# Patient Record
Sex: FEMALE | Race: WHITE | Employment: FULL TIME | URBAN - METROPOLITAN AREA
[De-identification: names, ages, dates, MRNs, and addresses within clinical notes are randomized per-mention and may not be internally consistent; named-entity substitution may affect disease eponyms.]

---

## 2017-05-08 LAB
HEPATITIS B SURFACE ANTIGEN INTERPRETATION: NORMAL
HEPATITIS C ANTIBODY INTERPRETATION: NORMAL

## 2017-05-09 LAB — RPR: NORMAL

## 2017-05-10 LAB — HIV-1 AND HIV-2 ANTIBODIES: NEGATIVE

## 2017-10-16 ENCOUNTER — OFFICE VISIT (OUTPATIENT)
Dept: INTERNAL MEDICINE CLINIC | Age: 32
End: 2017-10-16

## 2017-10-16 VITALS
BODY MASS INDEX: 23.25 KG/M2 | TEMPERATURE: 97.7 F | HEART RATE: 72 BPM | HEIGHT: 68 IN | SYSTOLIC BLOOD PRESSURE: 100 MMHG | DIASTOLIC BLOOD PRESSURE: 66 MMHG | WEIGHT: 153.4 LBS

## 2017-10-16 DIAGNOSIS — R53.82 CHRONIC FATIGUE: Primary | ICD-10-CM

## 2017-10-16 DIAGNOSIS — R49.0 HOARSE VOICE QUALITY: ICD-10-CM

## 2017-10-16 LAB
A/G RATIO: 1.5 (ref 1.1–2.2)
ALBUMIN SERPL-MCNC: 4.6 G/DL (ref 3.4–5)
ALP BLD-CCNC: 49 U/L (ref 40–129)
ALT SERPL-CCNC: 12 U/L (ref 10–40)
ANION GAP SERPL CALCULATED.3IONS-SCNC: 13 MMOL/L (ref 3–16)
AST SERPL-CCNC: 19 U/L (ref 15–37)
BASOPHILS ABSOLUTE: 0 K/UL (ref 0–0.2)
BASOPHILS RELATIVE PERCENT: 0.7 %
BILIRUB SERPL-MCNC: 0.6 MG/DL (ref 0–1)
BUN BLDV-MCNC: 16 MG/DL (ref 7–20)
CALCIUM SERPL-MCNC: 9.6 MG/DL (ref 8.3–10.6)
CHLORIDE BLD-SCNC: 99 MMOL/L (ref 99–110)
CO2: 27 MMOL/L (ref 21–32)
CREAT SERPL-MCNC: 0.6 MG/DL (ref 0.6–1.1)
EOSINOPHILS ABSOLUTE: 0.1 K/UL (ref 0–0.6)
EOSINOPHILS RELATIVE PERCENT: 1.2 %
GFR AFRICAN AMERICAN: >60
GFR NON-AFRICAN AMERICAN: >60
GLOBULIN: 3 G/DL
GLUCOSE BLD-MCNC: 91 MG/DL (ref 70–99)
HCT VFR BLD CALC: 40.8 % (ref 36–48)
HEMOGLOBIN: 13.9 G/DL (ref 12–16)
LYMPHOCYTES ABSOLUTE: 1.8 K/UL (ref 1–5.1)
LYMPHOCYTES RELATIVE PERCENT: 34.3 %
MCH RBC QN AUTO: 32.5 PG (ref 26–34)
MCHC RBC AUTO-ENTMCNC: 34 G/DL (ref 31–36)
MCV RBC AUTO: 95.7 FL (ref 80–100)
MONOCYTES ABSOLUTE: 0.3 K/UL (ref 0–1.3)
MONOCYTES RELATIVE PERCENT: 5.7 %
NEUTROPHILS ABSOLUTE: 3.1 K/UL (ref 1.7–7.7)
NEUTROPHILS RELATIVE PERCENT: 58.1 %
PDW BLD-RTO: 13.8 % (ref 12.4–15.4)
PLATELET # BLD: 204 K/UL (ref 135–450)
PMV BLD AUTO: 8.8 FL (ref 5–10.5)
POTASSIUM SERPL-SCNC: 4.9 MMOL/L (ref 3.5–5.1)
RBC # BLD: 4.27 M/UL (ref 4–5.2)
SODIUM BLD-SCNC: 139 MMOL/L (ref 136–145)
TOTAL PROTEIN: 7.6 G/DL (ref 6.4–8.2)
TSH REFLEX FT4: 2.19 UIU/ML (ref 0.27–4.2)
VITAMIN B-12: 998 PG/ML (ref 211–911)
WBC # BLD: 5.3 K/UL (ref 4–11)

## 2017-10-16 PROCEDURE — 99214 OFFICE O/P EST MOD 30 MIN: CPT | Performed by: INTERNAL MEDICINE

## 2017-10-18 LAB — IGA: 240 MG/DL (ref 68–408)

## 2017-10-19 LAB — TISSUE TRANSGLUTAMINASE IGA: 0 U/ML (ref 0–3)

## 2018-05-01 ENCOUNTER — TELEPHONE (OUTPATIENT)
Dept: INTERNAL MEDICINE CLINIC | Age: 33
End: 2018-05-01

## 2018-06-28 ENCOUNTER — OFFICE VISIT (OUTPATIENT)
Dept: INTERNAL MEDICINE CLINIC | Age: 33
End: 2018-06-28

## 2018-06-28 VITALS
HEIGHT: 68 IN | SYSTOLIC BLOOD PRESSURE: 108 MMHG | WEIGHT: 156.6 LBS | DIASTOLIC BLOOD PRESSURE: 72 MMHG | HEART RATE: 60 BPM | BODY MASS INDEX: 23.73 KG/M2

## 2018-06-28 DIAGNOSIS — H91.93 BILATERAL HEARING LOSS, UNSPECIFIED HEARING LOSS TYPE: Primary | ICD-10-CM

## 2018-06-28 PROCEDURE — 99213 OFFICE O/P EST LOW 20 MIN: CPT | Performed by: INTERNAL MEDICINE

## 2018-06-28 RX ORDER — FLUTICASONE PROPIONATE 50 MCG
1 SPRAY, SUSPENSION (ML) NASAL DAILY
COMMUNITY
End: 2021-02-04

## 2018-07-09 ENCOUNTER — HOSPITAL ENCOUNTER (OUTPATIENT)
Dept: CT IMAGING | Age: 33
Discharge: HOME | End: 2018-07-09
Attending: INTERNAL MEDICINE | Admitting: INTERNAL MEDICINE

## 2018-07-09 DIAGNOSIS — Z85.71 HISTORY OF HODGKIN'S LYMPHOMA: ICD-10-CM

## 2020-07-15 ENCOUNTER — TELEPHONE (OUTPATIENT)
Dept: ADMINISTRATIVE | Age: 35
End: 2020-07-15

## 2020-07-20 ENCOUNTER — OFFICE VISIT (OUTPATIENT)
Dept: PRIMARY CARE CLINIC | Age: 35
End: 2020-07-20
Payer: COMMERCIAL

## 2020-07-20 PROCEDURE — 99211 OFF/OP EST MAY X REQ PHY/QHP: CPT | Performed by: NURSE PRACTITIONER

## 2020-07-20 NOTE — PROGRESS NOTES
[facility] received a viral test for COVID-19. They were educated on isolation and quarantine as appropriate. For any symptoms, they were directed to seek care from their PCP, given contact information to establish with a doctor, directed to an urgent care or the emergency room.

## 2020-07-25 LAB
SARS-COV-2: NOT DETECTED
SOURCE: NORMAL

## 2020-08-05 ENCOUNTER — TELEPHONE (OUTPATIENT)
Dept: INTERNAL MEDICINE CLINIC | Age: 35
End: 2020-08-05

## 2020-08-05 NOTE — TELEPHONE ENCOUNTER
----- Message from Reji Waterman sent at 8/5/2020 11:14 AM EDT -----  Subject: Message to Provider    QUESTIONS  Information for Provider? Patient is requesting a motion sickness patch be   sent to pharmacy.   ---------------------------------------------------------------------------  --------------  [address]  What is the best way for the office to contact you? OK to leave message on   voicemail  Preferred Call Back Phone Number? [phone]  ---------------------------------------------------------------------------  --------------  SCRIPT ANSWERS  Relationship to Patient?  Self

## 2020-08-05 NOTE — TELEPHONE ENCOUNTER
Pharmacy was not given when call taken from [facility]  [facility] for patient to ask pharmacy

## 2020-08-05 NOTE — TELEPHONE ENCOUNTER
----- Message from Manueljoshbarry Johnson sent at 8/5/2020 12:37 PM EDT -----  Subject: Message to Provider    QUESTIONS  Information for Provider? Pt needs motion sick medication sent to [address] [place] [address]  ---------------------------------------------------------------------------  --------------  Annamaria MURILLO  What is the best way for the office to contact you? OK to leave message on   voicemail  Preferred Call Back Phone Number? [phone]  ---------------------------------------------------------------------------  --------------  SCRIPT ANSWERS  Relationship to Patient?  Self

## 2020-08-06 RX ORDER — SCOLOPAMINE TRANSDERMAL SYSTEM 1 MG/1
1 PATCH, EXTENDED RELEASE TRANSDERMAL
Qty: 2 PATCH | Refills: 11 | Status: SHIPPED | OUTPATIENT
Start: 2020-08-06 | End: 2021-02-04

## 2020-12-10 ENCOUNTER — OFFICE VISIT (OUTPATIENT)
Dept: PRIMARY CARE CLINIC | Age: 35
End: 2020-12-10
Payer: COMMERCIAL

## 2020-12-10 PROCEDURE — 99211 OFF/OP EST MAY X REQ PHY/QHP: CPT | Performed by: NURSE PRACTITIONER

## 2020-12-10 NOTE — PROGRESS NOTES
[facility] received a viral test for COVID-19. They were educated on isolation and quarantine as appropriate. For any symptoms, they were directed to seek care from their PCP, given contact information to establish with a doctor, directed to an urgent care or the emergency room.

## 2020-12-12 LAB — SARS-COV-2, NAA: NOT DETECTED

## 2021-01-28 ENCOUNTER — TELEPHONE (OUTPATIENT)
Dept: INTERNAL MEDICINE CLINIC | Age: 36
End: 2021-01-28

## 2021-01-28 NOTE — TELEPHONE ENCOUNTER
----- Message from Amparo Joyner sent at 1/28/2021  7:57 AM EST -----  Subject: Message to Provider    QUESTIONS  Information for Provider? Patient would like the Dr to call her something   in   states Preparation H is not working. [address] [employer].   ---------------------------------------------------------------------------  --------------  [address]  What is the best way for the office to contact you? OK to leave message on   voicemail  Preferred Call Back Phone Number? [phone]  ---------------------------------------------------------------------------  --------------  SCRIPT ANSWERS  Relationship to Patient?  Self

## 2021-01-28 NOTE — TELEPHONE ENCOUNTER
Called pt to advise nothing will be sent in until she is seen. She is not able to get off work today. Was going to schedule her for tomorrow but she states she is moving. Advised her she will need to be seen. Has not been seen in over 2 years.     Patient would like to know if there is something stronger OTC that she can take aside from Preparation H.

## 2021-02-04 ENCOUNTER — OFFICE VISIT (OUTPATIENT)
Dept: INTERNAL MEDICINE CLINIC | Age: 36
End: 2021-02-04
Payer: COMMERCIAL

## 2021-02-04 VITALS
SYSTOLIC BLOOD PRESSURE: 112 MMHG | DIASTOLIC BLOOD PRESSURE: 72 MMHG | HEART RATE: 84 BPM | WEIGHT: 153.4 LBS | TEMPERATURE: 97.3 F | HEIGHT: 67 IN | OXYGEN SATURATION: 99 % | BODY MASS INDEX: 24.08 KG/M2

## 2021-02-04 DIAGNOSIS — K64.4 INFLAMED EXTERNAL HEMORRHOID: ICD-10-CM

## 2021-02-04 DIAGNOSIS — Z23 NEED FOR INFLUENZA VACCINATION: ICD-10-CM

## 2021-02-04 DIAGNOSIS — K59.00 CONSTIPATION, UNSPECIFIED CONSTIPATION TYPE: ICD-10-CM

## 2021-02-04 DIAGNOSIS — Z00.00 ANNUAL PHYSICAL EXAM: Primary | ICD-10-CM

## 2021-02-04 PROCEDURE — 99395 PREV VISIT EST AGE 18-39: CPT | Performed by: NURSE PRACTITIONER

## 2021-02-04 PROCEDURE — 90471 IMMUNIZATION ADMIN: CPT | Performed by: NURSE PRACTITIONER

## 2021-02-04 PROCEDURE — 90686 IIV4 VACC NO PRSV 0.5 ML IM: CPT | Performed by: NURSE PRACTITIONER

## 2021-02-04 RX ORDER — HYDROCORTISONE 25 MG/G
CREAM TOPICAL 2 TIMES DAILY PRN
Qty: 1 TUBE | Refills: 1 | Status: SHIPPED | OUTPATIENT
Start: 2021-02-04 | End: 2021-08-19

## 2021-02-04 RX ORDER — LIDOCAINE 5 G/100G
1 CREAM RECTAL; TOPICAL 3 TIMES DAILY PRN
Qty: 1 TUBE | Refills: 1 | Status: SHIPPED | OUTPATIENT
Start: 2021-02-04 | End: 2021-08-19

## 2021-02-04 ASSESSMENT — ENCOUNTER SYMPTOMS
CHEST TIGHTNESS: 0
WHEEZING: 0
SHORTNESS OF BREATH: 0
COUGH: 0

## 2021-02-04 ASSESSMENT — PATIENT HEALTH QUESTIONNAIRE - PHQ9
SUM OF ALL RESPONSES TO PHQ QUESTIONS 1-9: 0
1. LITTLE INTEREST OR PLEASURE IN DOING THINGS: 0
SUM OF ALL RESPONSES TO PHQ QUESTIONS 1-9: 0

## 2021-02-04 NOTE — PROGRESS NOTES
2/4/21     Chief Complaint   Patient presents with    Annual Exam    Hemorrhoids     has had a hemorrhoid for 2 weeks now     HPI    Pt is here for annual exam  Overall doing well and feeling well     History of lymphoma - seeing [facility] - Dr. Marni Drummond   Recently had lab work complete there - reports stable     Hemorrhoid has been inflamed for the past 2 weeks   Gets them often. Usually able to control without intervention. Has been using Prep H   Reports they are bleeding intermittently with minimal amount of blood. Gets constipated often and has had hard stools   Does not drink a lot of water. Not taking anything for constipation. Increased stress makes it worse - recently moved into new home    Reports it has improved significantly but is still pretty large     No Known Allergies    Current Outpatient Medications   Medication Sig Dispense Refill    hydrocortisone (ANUSOL-HC) 2.5 % CREA rectal cream Place rectally 2 times daily as needed for Hemorrhoids 1 Tube 1    Lidocaine, Anorectal, 5 % CREA Apply 1 each topically 3 times daily as needed (Hemorrhoids) 1 Tube 1     No current facility-administered medications for this visit. Review of Systems   Constitutional: Negative for chills, fatigue and fever. Respiratory: Negative for cough, chest tightness, shortness of breath and wheezing. Cardiovascular: Negative for chest pain, palpitations and leg swelling. Neurological: Negative for dizziness, tremors, light-headedness and headaches. Vitals:    02/04/21 0849   BP: 112/72   Pulse: 84   Temp: 97.3 °F (36.3 °C)   SpO2: 99%   Weight: 153 lb 6.4 oz (69.6 kg)   Height: 5' 7\" (1.702 m)      Physical Exam  Vitals signs reviewed. Constitutional:       General: She is not in acute distress. Appearance: She is well-developed. She is not ill-appearing or diaphoretic. HENT:      Head: Normocephalic and atraumatic. Cardiovascular:      Rate and Rhythm: Normal rate and regular rhythm. Heart sounds: Normal heart sounds. No murmur. Pulmonary:      Effort: Pulmonary effort is normal. No respiratory distress. Breath sounds: Normal breath sounds. No wheezing or rhonchi. Genitourinary:     Rectum: Tenderness and external hemorrhoid present. Comments: Moderate sized tender inflammed external hemorrhoid, non thrombosed  Skin:     General: Skin is warm and dry. Neurological:      General: No focal deficit present. Mental Status: She is alert and oriented to person, place, and time. Psychiatric:         Mood and Affect: Mood and affect normal.         Behavior: Behavior normal.       Assessment/Plan:  Annual physical exam  Overall doing well   Labs with [facility] - will request     Inflamed external hemorrhoid / Constipation  Stable, uncontrolled yet improving   Referral for colorectal   Reviewed supportive care  Start fiber supplement daily for better control of constipation and avoid straining. Increase water intake. - hydrocortisone (ANUSOL-HC) 2.5 % CREA rectal cream; Place rectally 2 times daily as needed for Hemorrhoids  Dispense: 1 Tube; Refill: 1  - Lidocaine, Anorectal, 5 % CREA; Apply 1 each topically 3 times daily as needed (Hemorrhoids)  Dispense: 1 Tube; Refill: 1  - Makenna Cook MD, Colorectal Surgery, [facility]    Need for influenza vaccination  - INFLUENZA, QUADV, 3 YRS AND OLDER, IM PF, PREFILL SYR OR SDV, 0.5ML (Dalton Carrier, PF)    Discussed medications with patient, who voiced understanding of their use and indications. All questions answered.     Follow up yearly and prn     Electronically signed by ADAM Kathleen CNP on 2/4/2021 at 9:28 AM

## 2021-02-24 ENCOUNTER — OFFICE VISIT (OUTPATIENT)
Dept: SURGERY | Age: 36
End: 2021-02-24
Payer: COMMERCIAL

## 2021-02-24 VITALS
HEART RATE: 77 BPM | BODY MASS INDEX: 24.33 KG/M2 | WEIGHT: 155 LBS | RESPIRATION RATE: 16 BRPM | DIASTOLIC BLOOD PRESSURE: 74 MMHG | HEIGHT: 67 IN | SYSTOLIC BLOOD PRESSURE: 108 MMHG | TEMPERATURE: 97.5 F

## 2021-02-24 DIAGNOSIS — Z85.71 HISTORY OF HODGKIN'S DISEASE: ICD-10-CM

## 2021-02-24 DIAGNOSIS — K64.5 THROMBOSED EXTERNAL HEMORRHOID: Primary | ICD-10-CM

## 2021-02-24 PROCEDURE — 99203 OFFICE O/P NEW LOW 30 MIN: CPT | Performed by: SURGERY

## 2021-02-24 PROCEDURE — 46600 DIAGNOSTIC ANOSCOPY SPX: CPT | Performed by: SURGERY

## 2021-02-24 RX ORDER — M-VIT,TX,IRON,MINS/CALC/FOLIC 27MG-0.4MG
1 TABLET ORAL DAILY
COMMUNITY

## 2021-02-24 NOTE — PROGRESS NOTES
[address] [address] [address]  Dept: [phone]  Dept Fax: [phone]  Loc: [phone]    Visit Date: 2/24/2021    Tucker Shepherd is a 28 y.o. female who presents today for: Hemorrhoids      HPI:       Tucker Shepherd is a 28 y.o. female referred by Dr. Kirk Salcedo and María Butterfield for hemorrhoids. Patient has had 3 wks of hemorrhoids symptoms. Main complaints include: bulge, pain. Recently improving. Bleeding: yes  Constipation: yes  Mucus drainage: no  Tissue prolapse: no  Patient has tried: none  Previous anorectal surgeries: no  Incontinence: no    Patient denies fever, chills, abd pain, nausea, emesis, weight loss. Patient's problem list, medications, past medical, surgical, family, and social histories were reviewed and updated in the chart as indicated today. 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/2008    Hodgkin lymphoma ([facility]     Hx of biopsy 12/2007       Past Surgical History:   Procedure Laterality Date    BONE BIOPSY      BONE MARROW BIOPSY      LEEP      LEEP  [id number]    TUNNELED VENOUS PORT PLACEMENT      insert/removal    TUNNELED VENOUS PORT PLACEMENT  12/2007       Family History: Family history of colorectal cancer/polyps: no    Social History:   Social History     Tobacco Use    Smoking status: Never Smoker    Smokeless tobacco: Never Used   Substance Use Topics    Alcohol use: No     Alcohol/week: 7.0 standard drinks     Types: 7 Standard drinks or equivalent per week      Tobacco cessation counseling provided as appropriate. REVIEW OF SYSTEMS:    Pertinent positives and negatives are mentioned in the HPI above. Otherwise, all other systems were reviewed and negative.       Objective:     Physical Exam   /74 (Site: Left Upper Arm, Position: Sitting, Cuff Size: Medium Adult)   Pulse 77 Temp 97.5 °F (36.4 °C) (Temporal)   Resp 16   Ht 5' 7\" (1.702 m)   Wt 155 lb (70.3 kg)   BMI 24.28 kg/m²   Constitutional: Appears well-developed and well-nourished. Grooming appropriate. No gross deformities. Body mass index is 24.28 kg/m². Eyes: No scleral icterus. Conjunctiva/lids normal. Vision intact grossly. Pupils equal/symmetric, reactive bilaterally. ENT: External ears/nose without defect, scars, or masses. Hearing grossly intact. No facial deformity. Lips normal, normal dentition. Neck: No masses. Trachea midline. No crepitus. Thyroid not enlarged. Cardiovascular: Normal rate. No peripheral edema. Abdominal aorta normal size to palpation. Pulmonary/Chest: Effort normal. No respiratory distress. No wheezes. No use of accessory muscles. Musculoskeletal: Normal range of motion x all 4 extremities and head/neck, without deformity, pain, or crepitus, with normal strength and tone. Normal gait. Nails without clubbing or cyanosis. Neurological: Alert and oriented to person, place, and time. No gross deficits. Sensation intact. Skin: Skin is dry. No rashes noted. No pallor. No induration of nodules. Psychiatric: Normal mood and affect. Behavior normal. Oriented to person, place, and time. Judgment and insight reasonable. Abdominal/wound: soft, nontender    During my initial anorectal exam I was unable to obtain clear visualization of the anal canal, so I determined an anoscopy would be required. I discussed with the patient and they agreed to proceed with anoscopy. ANOSCOPY:    Chaperone/MA present in room during entire exam. Patient was placed in left lateral down or knee-chest positioning depending on patient comfort. Exam table manipulated for proper visualization and lighting. Buttocks spread. Inspection reveals: no perianal skin lesions, excoriation. External hemorrhoids/tags: yes - thrombosed small ext hemorrhoid without tenerness .      Digital exam performed with lubricated index finger revealing: no masses, induration, or tenderness. No gross blood. Normal tone. Lubricated anoscope inserted gently into anus and withdrawn with light attachment for visualization of distal rectum and anal canal: Mucosa appeared normal, without masses or evidence of proctitis. Mild internal hemorrhoidal tissue visible. Bleeding: no.    Anoscope removed without difficulty. Patient tolerated procedure well without complications. See below for further recommendations. Labs reviewed: none  Radiology reviewed: none    Last colonoscopy: none      Assessment/Plan:     A/P:  New problem(s): Hemorrhoids: thrombosed external  Established problem(s): history lymphoma  Additional workup/treatment planned and options discussed:   1) Lifestyle and stool regimen: Fiber supplementation, sitz baths, improving dietary and lifestyle choices  2) Procedural: Rubber band ligation  3) Hemorrhoidectomy - if fails less invasive meaures  Risk of complications/morbidity: moderate    Patient has signs, symptoms, and exam consistent with thrombosed external hemorrhoid, resolving. We discussed the pathophysiology, etiology, natural history, workup, and treatment options, including procedural and surgical risks for hemorrhoids. Discussed that if she does have a recurrence of external thrombosed hemorrhoid, if she calls me right away, we can plan for an office excision which can decrease the duration of time of the discomfort. Otherwise, the studies of hemorrhoids will resolve spontaneously but may take a few weeks. Colonoscopy if she continues to have rectal bleeding that does not improve after the hemorrhoid improves.     I provided written information in the After Visit Summary AVS Regarding: Hemorrhoids    DISPOSITION:  F/u for symptom reassessment, possible rubber band ligation    My findings will be relayed to consulting practitioner or PCP via Epic    Total encounter time:  30 min, including any number of the

## 2021-02-24 NOTE — PATIENT INSTRUCTIONS
Hemorrhoids: Information and Care Instructions        Hemorrhoids are enlarged veins that develop in the anal canal. They can occur with constipation, diarrhea, straining and pushing during bowel movements, pregnancy, and smoking/coughing. The tissue can get irritated and inflamed, causing bleeding, itching, swelling, and pain. Hemorrhoids can be internal or external (or occasionally both). Sometimes internal hemorrhoids can prolapse, or come out of the anus, causing difficulty keeping clean, mucus drainage, bleeding, and discomfort. External hemorrhoids are more likely to clot and cause sudden severe pain on the outside of the anus. They can be uncomfortable at times, but hemorrhoids rarely are a life-threatening problem. However, not all bleeding and pain can be blamed on hemorrhoids until a thorough examination (and sometimes a colonoscopy) is performed. The initial treatment for both internal and external hemorrhoids is the same, as below:    STOOL REGIMEN for hemorrhoids:    Stools should be soft, formed, and easy to pass consistency, not diarrhea, without the need to strain. 1) Eat a healthy diet with lots of fruits and vegetables. Exercise and be active. 2) Use a fiber supplement twice daily (Metamucil, Benefiber, Konsyl, Citrucel, generic brand, etc) per package instructions. - Women should aim for 25 grams of fiber daily.    - Men should aim for 30-35 grams of fiber daily. 3) Drink 6-8 glasses of water per day. This will work with the fiber to regulate your stools. 4) MiraLax is safe to use every day, and can be used to help lubricate and soften stool. 5) Colace stool softeners can also be used as needed. Avoid Senna and sennakot laxative products, as they may damage the colon with long-term use. Warm water sitz baths (sit in a tub filled with 3-4 inches of warm water) can be done 1-2 times daily for 5 min to help with hygiene and relaxation.      DO NOT STRAIN ON THE TOILET. DO NOT READ OR PLAY CELL PHONE GAMES ON THE TOILET. Stool regimen should be trialed for 6-8 weeks before considering additional procedures or surgery. What procedures are available for internal hemorrhoids that do not respond to the above stool regimen:    · Rubber Band Ligation: This procedure is performed in the office without anesthetic. A small scope is placed into the anus and small rubber bands are placed over the hemorrhoid tissue. This causes excess tissue to fall off and scar into the rectum. This is done for patients with internal hemorrhoids only. Often this procedure needs to be repeated. Complication rates are very low. There is only minor discomfort and no down time. · Surgical excision (Hemorrhoidectomy): This surgery is performed in the operating room with sedation. The hemorrhoid tissue is cut out and wounds are stitched back together. It has low complications rates and has a 95% long term success rate. It is a painful procedure, however, and most patients require at least 2 weeks off from work/school. This may be recommended for patients with large hemorrhoids, and those who wish to have internal and external hemorrhoids addressed simultaneously, and those who desire the most definitive procedure. · Colonoscopy: This is an adjunct procedure in patients with rectal bleeding. Depending on your age and family history, colonoscopy may be recommended before pursuing hemorrhoid procedures. This is to ensure that the source of bleeding is truly hemorrhoids, and not from polyps, cancer, or inflammation located elsewhere in the colon or rectum. What about over-the-counter ointments, creams, and suppositories? Unfortunately for consumers, there is very little actual scientific data to support the use of any over-the-counter products. These usually aren't harmful to try for a few weeks, and may help with some symptoms, but all in all are a waste of money.  Again, these will just merely mask the symptoms and do not actually address the underlying problem. Some of these (especially steroid-based creams), can actually cause damage to the anus and skin if used over a longer period of time (more than 3 weeks). What about external hemorrhoids? External hemorrhoids can clot or \"thrombose\". This can cause sudden onset of severe pain. Typically the clot will dissolve or push through the skin on its own within 5-7 days. However, if patients are seen within the first 1-3 days of the start of the pain, the clot and external hemorrhoid can be excised (cut) in the office, reducing the duration of pain and reducing healing time. This is typically done with local anesthetic injection. After an external hemorrhoid heals, typically there is a small skin tag that is left behind. No treatment is necessary for skin tags unless there is interference with hygiene. When should you call the office? · You have any questions or concerns. · You have excessive bleeding, fever, chills, or inability to urinate. · The office phone # is [phone]  · If you are unable to reach the office (outside of normal business hours) and you have any concerns, go to your nearest emergency room.

## 2021-08-19 ENCOUNTER — OFFICE VISIT (OUTPATIENT)
Dept: INTERNAL MEDICINE CLINIC | Age: 36
End: 2021-08-19
Payer: COMMERCIAL

## 2021-08-19 VITALS
WEIGHT: 158 LBS | HEART RATE: 90 BPM | DIASTOLIC BLOOD PRESSURE: 68 MMHG | OXYGEN SATURATION: 98 % | SYSTOLIC BLOOD PRESSURE: 110 MMHG | BODY MASS INDEX: 24.75 KG/M2

## 2021-08-19 DIAGNOSIS — T75.3XXA MOTION SICKNESS, INITIAL ENCOUNTER: Primary | ICD-10-CM

## 2021-08-19 DIAGNOSIS — R42 VERTIGO: ICD-10-CM

## 2021-08-19 DIAGNOSIS — R11.0 NAUSEA: ICD-10-CM

## 2021-08-19 DIAGNOSIS — H83.2X9 VESTIBULAR DYSFUNCTION, UNSPECIFIED LATERALITY: ICD-10-CM

## 2021-08-19 PROCEDURE — 99214 OFFICE O/P EST MOD 30 MIN: CPT | Performed by: NURSE PRACTITIONER

## 2021-08-19 RX ORDER — METHYLPREDNISOLONE 4 MG/1
TABLET ORAL
Qty: 1 KIT | Refills: 0 | Status: SHIPPED | OUTPATIENT
Start: 2021-08-19 | End: 2022-06-21 | Stop reason: ALTCHOICE

## 2021-08-19 SDOH — ECONOMIC STABILITY: FOOD INSECURITY: WITHIN THE PAST 12 MONTHS, YOU WORRIED THAT YOUR FOOD WOULD RUN OUT BEFORE YOU GOT MONEY TO BUY MORE.: NEVER TRUE

## 2021-08-19 SDOH — ECONOMIC STABILITY: FOOD INSECURITY: WITHIN THE PAST 12 MONTHS, THE FOOD YOU BOUGHT JUST DIDN'T LAST AND YOU DIDN'T HAVE MONEY TO GET MORE.: NEVER TRUE

## 2021-08-19 ASSESSMENT — SOCIAL DETERMINANTS OF HEALTH (SDOH): HOW HARD IS IT FOR YOU TO PAY FOR THE VERY BASICS LIKE FOOD, HOUSING, MEDICAL CARE, AND HEATING?: NOT HARD AT ALL

## 2021-08-19 NOTE — PROGRESS NOTES
8/20/21     Chief Complaint   Patient presents with    Nausea     After flying last week, feels like motion sickness      HPI     History of motion sickness, as a passenger in a car. Usually drives because it helps. Since flying home from [facility] last Sunday (Aug 8) is having persistent motion sickness. Reports having nausea, HA and vertigo just like her previous motion sickness with any movement - change directions, quick head turns, driving, riding in a care etc - not improving since onset. Flight to [facility] she reports she had motion sickness but resolved after landing. Avoiding things that can exacerbate her vertigo and nausea. Denies ear pain, pressure  Using flonase daily - helping with chronic sinus pain and pressure   Has tried dramamine - but makes her so tired she cannot tolerate it.     covid shot on 8/11/2021 pfzier - did not exacerbate or change above symptoms     Has IUD in place, denies concerns for pregnancy. No Known Allergies    Current Outpatient Medications   Medication Sig Dispense Refill    methylPREDNISolone (MEDROL DOSEPACK) 4 MG tablet Take by mouth. 1 kit 0    Multiple Vitamins-Minerals (THERAPEUTIC MULTIVITAMIN-MINERALS) tablet Take 1 tablet by mouth daily       No current facility-administered medications for this visit. Review of Systems   Constitutional: Negative for chills, fatigue and fever. Respiratory: Negative for cough, chest tightness, shortness of breath and wheezing. Cardiovascular: Negative for chest pain, palpitations and leg swelling. Neurological: Negative for tremors, light-headedness and headaches. Vitals:    08/19/21 1039   BP: 110/68   Pulse: 90   SpO2: 98%   Weight: 158 lb (71.7 kg)      Physical Exam  Vitals reviewed. Constitutional:       General: She is not in acute distress. Appearance: Normal appearance. She is well-developed. She is not ill-appearing or diaphoretic. HENT:      Head: Normocephalic and atraumatic.       Right Ear: Tympanic membrane and ear canal normal.      Left Ear: Tympanic membrane and ear canal normal.      Nose: No congestion. Mouth/Throat:      Mouth: Mucous membranes are moist.   Eyes:      Extraocular Movements: Extraocular movements intact. Pupils: Pupils are equal, round, and reactive to light. Cardiovascular:      Rate and Rhythm: Normal rate and regular rhythm. Heart sounds: Normal heart sounds. No murmur heard. Pulmonary:      Effort: Pulmonary effort is normal. No respiratory distress. Breath sounds: Normal breath sounds. No wheezing or rhonchi. Skin:     General: Skin is warm and dry. Neurological:      General: No focal deficit present. Mental Status: She is alert and oriented to person, place, and time. Cranial Nerves: No cranial nerve deficit (CN II - XII intact). Psychiatric:         Mood and Affect: Mood and affect normal.         Behavior: Behavior normal.       Assessment/Plan: Motion sickness, initial encounter/ Vertigo / Nausea/  Vestibular dysfunction, unspecified laterality  Uncontrolled since recent flight  Okay to use meclizine prn - not using frequently due to s/e   Offered to send over zofran for nausea, declined due to concern for HA as a s/e   Home therapy exercises provided for supportive vertigo care  Steroids for exacerbation of vestibular dysfunction   Declined pregnancy risk / test  - methylPREDNISolone (MEDROL DOSEPACK) 4 MG tablet; Take by mouth. Dispense: 1 kit; Refill: 0    Discussed medications with patient, who voiced understanding of their use and indications. All questions answered. Return if symptoms worsen or fail to improve.       Electronically signed by ADAM Sherman CNP on 8/20/2021 at 1:47 PM

## 2021-08-19 NOTE — PATIENT INSTRUCTIONS
Patient Education        Epley Maneuver at Home for Vertigo: Exercises  Introduction  Vertigo is a spinning or whirling sensation when you move your head. Your doctor may have moved you in different positions to help your vertigo get better faster. This is called the Epley maneuver. Your doctor also may have asked you to do these exercises at home. Do the exercises as often as your doctor recommends. If your vertigo is getting worse, your doctor may have you change the exercise or stop it. Step 1  Step 1   1. Sit on the edge of a bed or sofa. Step 2   1. Turn your head 45 degrees in the direction your doctor told you to. This should be toward the ear that causes the most vertigo for you. In this picture, the woman is turning toward her left ear. Step 3   1. Tilt yourself backward until you are lying on your back. Your head should still be at a 45-degree turn. Your head should be about midway between looking straight ahead and looking out to your side. Hold for 30 seconds. If you have vertigo, stay in this position until it stops. Step 4   1. Turn your head 90 degrees toward the ear that has the least vertigo. In this picture, the woman is turning to the right because she has vertigo on her left side. The point of your chin should be raised and over your shoulder. Hold for 30 seconds. Step 5   1. Roll onto the side with the least vertigo. You should now be looking at the floor. Hold for 30 seconds. Follow-up care is a key part of your treatment and safety. Be sure to make and go to all appointments, and call your doctor if you are having problems. It's also a good idea to know your test results and keep a list of the medicines you take. Where can you learn more? Go to https://challyeb.[employer]. org and sign in to your [employer] account. Enter O460 in the [employer] box to learn more about \"Epley Maneuver at Home for Vertigo: Exercises. \"     If you do not have an account, please click on the \"Sign Up Now\" link. Current as of: August 4, 2020               Content Version: 12.9  © 2006-2021 [employer]. Care instructions adapted under license by [facility] Formerly [facility] ([facility]). If you have questions about a medical condition or this instruction, always ask your healthcare professional. [address] any warranty or liability for your use of this information. Patient Education        [place] Exercises for Vertigo: Care Instructions  Your Care Instructions  Simple exercises can help you regain your balance when you have vertigo. If you have Ménière's disease, benign paroxysmal positional vertigo (BPPV), or another inner ear problem, you may have vertigo off and on. Do these exercises first thing in the morning and before you go to bed. You might get dizzy when you first start them. If this happens, try to do them for at least 5 minutes. Do a group of exercises at a time, starting at the top of the list. It may take several weeks before you can do all the exercises without feeling dizzy. Follow-up care is a key part of your treatment and safety. Be sure to make and go to all appointments, and call your doctor if you are having problems. It's also a good idea to know your test results and keep a list of the medicines you take. How can you care for yourself at home? Exercise 1  While sitting on the side of the bed and holding your head still:  · Look up as far as you can. · Look down as far as you can. · Look from side to side as far as you can. · Stretch your arm straight out in front of you. Focus on your index finger. Continue to focus on your finger while you bring it to your nose. Exercise 2  While sitting on the side of the bed:  · Bring your head as far back as you can. · Bring your head forward to touch your chin to your chest.  · Turn your head from side to side. · Do these exercises first with your eyes open.  Then try with your eyes

## 2021-08-20 ASSESSMENT — ENCOUNTER SYMPTOMS
WHEEZING: 0
COUGH: 0
CHEST TIGHTNESS: 0
SHORTNESS OF BREATH: 0

## 2021-08-31 LAB
CANDIDA SPECIES, DNA PROBE: NORMAL
GARDNERELLA VAGINALIS, DNA PROBE: NORMAL
TRICHOMONAS VAGINALIS DNA: NORMAL

## 2021-09-01 LAB
HPV COMMENT: ABNORMAL
HPV TYPE 16: NOT DETECTED
HPV TYPE 18: NOT DETECTED
HPVOH (OTHER TYPES): DETECTED

## 2022-01-11 ENCOUNTER — TELEPHONE (OUTPATIENT)
Dept: INTERNAL MEDICINE CLINIC | Age: 37
End: 2022-01-11

## 2022-01-11 DIAGNOSIS — M79.10 MYALGIA: Primary | ICD-10-CM

## 2022-01-11 NOTE — TELEPHONE ENCOUNTER
----- Message from Bita Garcia sent at 1/11/2022  9:34 AM EST -----  Subject: Message to Provider    QUESTIONS  Information for Provider? exposed to COVID-23 Sunday, started with low   grade temp, headache and muscle aches yesterday. asking if she can get a   COVID test  ---------------------------------------------------------------------------  --------------  CALL BACK INFO  What is the best way for the office to contact you? OK to leave message on   voicemail  Preferred Call Back Phone Number? [phone]  ---------------------------------------------------------------------------  --------------  SCRIPT ANSWERS  Relationship to Patient?  Self

## 2022-01-12 LAB — SARS-COV-2: DETECTED

## 2022-06-20 ENCOUNTER — HOSPITAL ENCOUNTER (OUTPATIENT)
Dept: CT IMAGING | Age: 37
Discharge: HOME | End: 2022-06-20
Payer: COMMERCIAL

## 2022-06-20 DIAGNOSIS — Z85.71 HISTORY OF HODGKIN'S DISEASE: ICD-10-CM

## 2022-06-20 PROCEDURE — 6360000004 HC RX CONTRAST MEDICATION

## 2022-06-20 PROCEDURE — 70491 CT SOFT TISSUE NECK W/DYE: CPT

## 2022-06-20 RX ADMIN — IOPAMIDOL 50 ML: 755 INJECTION, SOLUTION INTRAVENOUS at 17:33

## 2022-06-21 ENCOUNTER — OFFICE VISIT (OUTPATIENT)
Dept: INTERNAL MEDICINE CLINIC | Age: 37
End: 2022-06-21
Payer: COMMERCIAL

## 2022-06-21 VITALS
HEART RATE: 80 BPM | BODY MASS INDEX: 25.05 KG/M2 | WEIGHT: 159.6 LBS | SYSTOLIC BLOOD PRESSURE: 106 MMHG | DIASTOLIC BLOOD PRESSURE: 72 MMHG | HEIGHT: 67 IN

## 2022-06-21 DIAGNOSIS — Z00.00 PREVENTATIVE HEALTH CARE: Primary | ICD-10-CM

## 2022-06-21 LAB
CHOLESTEROL, TOTAL: 195 MG/DL (ref 0–199)
HDLC SERPL-MCNC: 66 MG/DL (ref 40–60)
LDL CHOLESTEROL CALCULATED: 111 MG/DL
TRIGL SERPL-MCNC: 91 MG/DL (ref 0–150)
VLDLC SERPL CALC-MCNC: 18 MG/DL

## 2022-06-21 PROCEDURE — 99395 PREV VISIT EST AGE 18-39: CPT | Performed by: INTERNAL MEDICINE

## 2022-06-21 ASSESSMENT — PATIENT HEALTH QUESTIONNAIRE - PHQ9
SUM OF ALL RESPONSES TO PHQ9 QUESTIONS 1 & 2: 0
1. LITTLE INTEREST OR PLEASURE IN DOING THINGS: 0
SUM OF ALL RESPONSES TO PHQ QUESTIONS 1-9: 0
2. FEELING DOWN, DEPRESSED OR HOPELESS: 0
SUM OF ALL RESPONSES TO PHQ QUESTIONS 1-9: 0

## 2022-06-21 NOTE — PROGRESS NOTES
Chief Complaint   Patient presents with    Annual Exam     Pt would like to discuss CT scan results. ROS neg except for Hot flashes, abnormal pap smears        HPI  Presenting for well care. Past Medical History:   Diagnosis Date    Asthma     sports induced    [facility] [address]     Genital HSV     last outbreak 7/2008    Hodgkin [facility] [address]     Hot flashes     Hx of biopsy 12/2007        Past Surgical History:   Procedure Laterality Date    BONE BIOPSY      BONE MARROW BIOPSY      LEEP      LEEP  [id number]    TUNNELED VENOUS PORT PLACEMENT      insert/removal    TUNNELED VENOUS PORT PLACEMENT  12/2007       Social History     Tobacco Use    Smoking status: Never Smoker    Smokeless tobacco: Never Used   Substance Use Topics    Alcohol use: Yes     Alcohol/week: 3.0 standard drinks     Types: 3 Standard drinks or equivalent per week    Drug use: Yes     Types: Marijuana [place])     Comment: once weekly       Family History   Problem Relation Age of Onset    Mental Illness Mother     Diabetes Father     Cancer Father         prostate    Cancer Sister         hodgkin's lymphoma    Cancer Maternal Grandmother     Cancer Maternal Grandfather     Cancer Paternal Grandmother     Cancer Paternal Grandfather           Review of Systems  Developed swelling to the right side of the neck about a month ago, since onset it has improved over the past week. She saw her oncologist and had a CT scan of the neck yesterday (results pending). +hot flashes in the winter, but now infrequent  She does not have mentstrual periods, but she has the Mirena IUD  Palpitations about every other week lasting 20 seconds and then resolves spontaneously  All other systems reviewed and negative      EXAM:  /72   Pulse 80   Ht 5' 7\" (1.702 m)   Wt 159 lb 9.6 oz (72.4 kg)   BMI 25.00 kg/m²    Gen: WN/WD, no acute distress  Head: normocephalic, atraumatic  Eyes: no icterus, no conjunctival erythema.  Pupils reactive to light.  ENT: Moist mucous membranes, normal appearing nose, OP pink and moist  Neck: supple, there is a pea-sized mobile nodule in the right anterior cervical chain. There are no other palpable neck masses or supraclavicular masses  CV: regular rate and rhythm, no murmurs, rubs, gallops. No peripheral edema  Resp: Normal effort, clear to auscultation bilaterally  Abd: +Bowel Sounds, soft, non tender to palpation. MSK: no joint swelling, no cyanosis or clubbing  Skin: warm, dry, no rashes visualized  Neuro: Cranial Nerves intact, no focal motor deficits  Psych: normal mood and affect. Appropriately oriented. Lab Results   Component Value Date    CREATININE 0.6 10/16/2017    BUN 16 10/16/2017     10/16/2017    K 4.9 10/16/2017    CL 99 10/16/2017    CO2 27 10/16/2017      Lab Results   Component Value Date    CHOL 159 02/06/2013     Lab Results   Component Value Date    TRIG 47 02/06/2013     Lab Results   Component Value Date    HDL 75 (H) 02/06/2013     Lab Results   Component Value Date    LDLCALC 75 02/06/2013     Lab Results   Component Value Date    LABVLDL 9 02/06/2013     No results found for: CHOLHDLRATIO     A/P  1. Preventative health care  She remains in good health. CT Neck results pending  Labs from oncologist reviewed and include CBC with diff and CMP all within normal limits         She is UTD on recommended cancer screening  She has infrequent self-limited palpitations and a normal cardiac exam.  She will contact the office if these increase in frequency or intensity  She will try to increase her exercise to 30 minutes daily  The COVID booster shot was recommended  Lipid screening will be obtained today, other BW is UTD  Discussed options of referral for therapy or medication to help with stress/anxiety. She is not interested at this time, but is aware of these options if symptoms worsen.       RTO in about a year and PRN

## 2023-01-11 ENCOUNTER — TELEPHONE (OUTPATIENT)
Dept: INTERNAL MEDICINE CLINIC | Age: 38
End: 2023-01-11

## 2023-01-11 NOTE — TELEPHONE ENCOUNTER
Pt is calling in regards to needing a refill of scopolamine (TRANSDERM-SCOP, 1.5 MG,) transdermal patch sent to the [facility] on [address]. Patient states she is flying on Monday and needs those for her flight. If any questions or concerns please call Patient back.

## 2023-01-13 RX ORDER — SCOLOPAMINE TRANSDERMAL SYSTEM 1 MG/1
1 PATCH, EXTENDED RELEASE TRANSDERMAL
Qty: 2 PATCH | Refills: 11 | Status: SHIPPED | OUTPATIENT
Start: 2023-01-13 | End: 2024-01-13

## 2023-02-01 ENCOUNTER — OFFICE VISIT (OUTPATIENT)
Dept: INTERNAL MEDICINE CLINIC | Age: 38
End: 2023-02-01
Payer: COMMERCIAL

## 2023-02-01 VITALS
SYSTOLIC BLOOD PRESSURE: 114 MMHG | WEIGHT: 167 LBS | OXYGEN SATURATION: 97 % | BODY MASS INDEX: 26.16 KG/M2 | DIASTOLIC BLOOD PRESSURE: 70 MMHG | HEART RATE: 95 BPM

## 2023-02-01 DIAGNOSIS — G43.009 MIGRAINE WITHOUT AURA AND WITHOUT STATUS MIGRAINOSUS, NOT INTRACTABLE: Primary | ICD-10-CM

## 2023-02-01 PROCEDURE — 99213 OFFICE O/P EST LOW 20 MIN: CPT | Performed by: NURSE PRACTITIONER

## 2023-02-01 RX ORDER — SUMATRIPTAN 50 MG/1
50 TABLET, FILM COATED ORAL
Qty: 9 TABLET | Refills: 3 | Status: SHIPPED | OUTPATIENT
Start: 2023-02-01 | End: 2023-02-01

## 2023-02-01 SDOH — ECONOMIC STABILITY: FOOD INSECURITY: WITHIN THE PAST 12 MONTHS, THE FOOD YOU BOUGHT JUST DIDN'T LAST AND YOU DIDN'T HAVE MONEY TO GET MORE.: NEVER TRUE

## 2023-02-01 SDOH — ECONOMIC STABILITY: HOUSING INSECURITY
IN THE LAST 12 MONTHS, WAS THERE A TIME WHEN YOU DID NOT HAVE A STEADY PLACE TO SLEEP OR SLEPT IN A SHELTER (INCLUDING NOW)?: NO

## 2023-02-01 SDOH — ECONOMIC STABILITY: FOOD INSECURITY: WITHIN THE PAST 12 MONTHS, YOU WORRIED THAT YOUR FOOD WOULD RUN OUT BEFORE YOU GOT MONEY TO BUY MORE.: NEVER TRUE

## 2023-02-01 SDOH — ECONOMIC STABILITY: INCOME INSECURITY: HOW HARD IS IT FOR YOU TO PAY FOR THE VERY BASICS LIKE FOOD, HOUSING, MEDICAL CARE, AND HEATING?: NOT HARD AT ALL

## 2023-02-01 ASSESSMENT — PATIENT HEALTH QUESTIONNAIRE - PHQ9
2. FEELING DOWN, DEPRESSED OR HOPELESS: 0
SUM OF ALL RESPONSES TO PHQ QUESTIONS 1-9: 0
SUM OF ALL RESPONSES TO PHQ QUESTIONS 1-9: 0
1. LITTLE INTEREST OR PLEASURE IN DOING THINGS: 0
SUM OF ALL RESPONSES TO PHQ QUESTIONS 1-9: 0
SUM OF ALL RESPONSES TO PHQ QUESTIONS 1-9: 0
SUM OF ALL RESPONSES TO PHQ9 QUESTIONS 1 & 2: 0

## 2023-02-01 NOTE — PROGRESS NOTES
2/1/23     Chief Complaint   Patient presents with    Migraine     For about a year. Has never been prescribed anything - Did try her husbands Sumatriptan 50mg after having a 3 day migraine and it did help her     HPI    Here for migraines   Getting more frequent the past year  Having 1-2 per month   Eating well, could exercise more  Trying to find triggers without much success  She reports associated nausea without emesis, photophobia   Works at [employer] in front of a computer all day   Having some eye strain, saw eye doctor and using blue light glasses without much relief   Tries OTC Excedrin and nsaids without relief     No Known Allergies    Current Outpatient Medications   Medication Sig Dispense Refill    SUMAtriptan (IMITREX) 50 MG tablet Take 1 tablet by mouth once as needed for Migraine 9 tablet 3    Multiple Vitamins-Minerals (THERAPEUTIC MULTIVITAMIN-MINERALS) tablet Take 1 tablet by mouth daily       No current facility-administered medications for this visit. Review of Systems  Negative other than HPI    Vitals:    02/01/23 0833   BP: 114/70   Pulse: 95   SpO2: 97%   Weight: 167 lb (75.8 kg)      Physical Exam  Constitutional:       General: She is not in acute distress. Appearance: Normal appearance. She is not ill-appearing. HENT:      Head: Normocephalic and atraumatic. Pulmonary:      Effort: Pulmonary effort is normal. No respiratory distress. Neurological:      Mental Status: She is alert and oriented to person, place, and time. Mental status is at baseline. Psychiatric:         Mood and Affect: Mood normal.         Behavior: Behavior normal.     Assessment/Plan:  Migraine without aura and without status migrainosus, not intractable  Chronic, intermittent, uncontrolled.   Discussed options in detail   [place] of sumatriptan prn   Discussed daily prophylaxis - follow up if persistent / recurrent   Reviewed supportive care with hydration, avoiding known triggers   - SUMAtriptan (IMITREX) 50 MG tablet; Take 1 tablet by mouth once as needed for Migraine  Dispense: 9 tablet; Refill: 3    Discussed medications with patient, who voiced understanding of their use and indications. All questions answered.     Keep appt in June for annual exam     Electronically signed by ADAM Keen Chi, CNP on 2/1/2023 at 8:46 AM

## 2023-06-26 ENCOUNTER — OFFICE VISIT (OUTPATIENT)
Dept: INTERNAL MEDICINE CLINIC | Age: 38
End: 2023-06-26
Payer: COMMERCIAL

## 2023-06-26 VITALS
BODY MASS INDEX: 26.06 KG/M2 | DIASTOLIC BLOOD PRESSURE: 78 MMHG | WEIGHT: 166 LBS | HEIGHT: 67 IN | HEART RATE: 72 BPM | SYSTOLIC BLOOD PRESSURE: 120 MMHG

## 2023-06-26 DIAGNOSIS — Z00.00 ENCOUNTER FOR WELL ADULT EXAM WITHOUT ABNORMAL FINDINGS: Primary | ICD-10-CM

## 2023-06-26 DIAGNOSIS — Z71.89 ACP (ADVANCE CARE PLANNING): ICD-10-CM

## 2023-06-26 DIAGNOSIS — R07.89 CHEST WALL PAIN: ICD-10-CM

## 2023-06-26 DIAGNOSIS — L98.9 SKIN LESION OF LEFT LEG: ICD-10-CM

## 2023-06-26 PROCEDURE — 99395 PREV VISIT EST AGE 18-39: CPT | Performed by: NURSE PRACTITIONER

## 2023-10-31 ENCOUNTER — PATIENT MESSAGE (OUTPATIENT)
Dept: INTERNAL MEDICINE CLINIC | Age: 38
End: 2023-10-31

## 2023-10-31 DIAGNOSIS — G43.009 MIGRAINE WITHOUT AURA AND WITHOUT STATUS MIGRAINOSUS, NOT INTRACTABLE: ICD-10-CM

## 2023-10-31 NOTE — TELEPHONE ENCOUNTER
From: [facility]  To: Halima Henson  Sent: 10/31/2023 1:12 PM EDT  Subject: Rx    Hi Maria Luisa Aranda,     I hope this Email finds you and your family well! I am writing to request another round of refills of Sumatriptan 50mg. I just picked up my last refill from [place] on [address]. I have not had the need to refill a prescription in one month's time, but it is definitely a lifesaver when I do get a migraine. Please let me know if you have any questions or concerns.      Very truly yours,   [facility]  [address] [place]: [phone]

## 2023-11-01 RX ORDER — SUMATRIPTAN 50 MG/1
50 TABLET, FILM COATED ORAL DAILY PRN
Qty: 9 TABLET | Refills: 3 | Status: SHIPPED | OUTPATIENT
Start: 2023-11-01

## 2024-01-31 DIAGNOSIS — G43.009 MIGRAINE WITHOUT AURA AND WITHOUT STATUS MIGRAINOSUS, NOT INTRACTABLE: ICD-10-CM

## 2024-02-01 RX ORDER — SUMATRIPTAN 50 MG/1
TABLET, FILM COATED ORAL
Qty: 9 TABLET | Refills: 3 | OUTPATIENT
Start: 2024-02-01

## 2024-02-05 ASSESSMENT — PATIENT HEALTH QUESTIONNAIRE - PHQ9
2. FEELING DOWN, DEPRESSED OR HOPELESS: SEVERAL DAYS
1. LITTLE INTEREST OR PLEASURE IN DOING THINGS: 1
SUM OF ALL RESPONSES TO PHQ QUESTIONS 1-9: 2
1. LITTLE INTEREST OR PLEASURE IN DOING THINGS: SEVERAL DAYS
SUM OF ALL RESPONSES TO PHQ9 QUESTIONS 1 & 2: 2
SUM OF ALL RESPONSES TO PHQ QUESTIONS 1-9: 2
SUM OF ALL RESPONSES TO PHQ9 QUESTIONS 1 & 2: 2
SUM OF ALL RESPONSES TO PHQ QUESTIONS 1-9: 2
2. FEELING DOWN, DEPRESSED OR HOPELESS: 1

## 2024-02-06 ENCOUNTER — OFFICE VISIT (OUTPATIENT)
Dept: INTERNAL MEDICINE CLINIC | Age: 39
End: 2024-02-06
Payer: COMMERCIAL

## 2024-02-06 VITALS
BODY MASS INDEX: 25.08 KG/M2 | HEART RATE: 73 BPM | DIASTOLIC BLOOD PRESSURE: 80 MMHG | OXYGEN SATURATION: 98 % | SYSTOLIC BLOOD PRESSURE: 102 MMHG | HEIGHT: 67 IN | WEIGHT: 159.8 LBS

## 2024-02-06 DIAGNOSIS — G43.009 MIGRAINE WITHOUT AURA AND WITHOUT STATUS MIGRAINOSUS, NOT INTRACTABLE: Primary | ICD-10-CM

## 2024-02-06 PROCEDURE — 99213 OFFICE O/P EST LOW 20 MIN: CPT | Performed by: NURSE PRACTITIONER

## 2024-02-06 RX ORDER — SUMATRIPTAN 100 MG/1
100 TABLET, FILM COATED ORAL DAILY PRN
Qty: 20 TABLET | Refills: 1 | Status: SHIPPED | OUTPATIENT
Start: 2024-02-06

## 2024-02-06 RX ORDER — AMITRIPTYLINE HYDROCHLORIDE 10 MG/1
10 TABLET, FILM COATED ORAL NIGHTLY
Qty: 90 TABLET | Refills: 0 | Status: SHIPPED | OUTPATIENT
Start: 2024-02-06

## 2024-02-06 SDOH — ECONOMIC STABILITY: FOOD INSECURITY: WITHIN THE PAST 12 MONTHS, THE FOOD YOU BOUGHT JUST DIDN'T LAST AND YOU DIDN'T HAVE MONEY TO GET MORE.: NEVER TRUE

## 2024-02-06 SDOH — ECONOMIC STABILITY: INCOME INSECURITY: HOW HARD IS IT FOR YOU TO PAY FOR THE VERY BASICS LIKE FOOD, HOUSING, MEDICAL CARE, AND HEATING?: NOT HARD AT ALL

## 2024-02-06 SDOH — ECONOMIC STABILITY: FOOD INSECURITY: WITHIN THE PAST 12 MONTHS, YOU WORRIED THAT YOUR FOOD WOULD RUN OUT BEFORE YOU GOT MONEY TO BUY MORE.: NEVER TRUE

## 2024-02-06 NOTE — ASSESSMENT & PLAN NOTE
Chronic, uncontrolled.   Work on decreasing external stressors   Continue to work on supportive care and healthy lifestyle modifications.   Increase sumatriptan dose to 100 mg tab daily as needed  Okay to use prn excedrin   Avoid daily nsaid use to prevent rebound HA  Start amitriptyline 10 mg nightly for prophylaxis

## 2024-02-06 NOTE — PROGRESS NOTES
intractable  Assessment & Plan:  Chronic, uncontrolled.   Work on decreasing external stressors   Continue to work on supportive care and healthy lifestyle modifications.   Increase sumatriptan dose to 100 mg tab daily as needed  Okay to use prn excedrin   Avoid daily nsaid use to prevent rebound HA  Start amitriptyline 10 mg nightly for prophylaxis   Orders:  -     SUMAtriptan (IMITREX) 100 MG tablet; Take 1 tablet by mouth daily as needed for Migraine, Disp-20 tablet, R-1Normal  -     amitriptyline (ELAVIL) 10 MG tablet; Take 1 tablet by mouth nightly, Disp-90 tablet, R-0Normal     Discussed medications with patient, who voiced understanding of their use and indications. All questions answered.    Return if symptoms worsen or fail to improve, for keep appt for follow up as scheduled. or sooner if migraines are not improving.      Electronically signed by ADAM Horne CNP on 2/6/2024 at 9:07 AM

## 2024-03-21 ENCOUNTER — OFFICE VISIT (OUTPATIENT)
Dept: INTERNAL MEDICINE CLINIC | Age: 39
End: 2024-03-21
Payer: COMMERCIAL

## 2024-03-21 VITALS
WEIGHT: 162 LBS | OXYGEN SATURATION: 98 % | DIASTOLIC BLOOD PRESSURE: 78 MMHG | BODY MASS INDEX: 25.37 KG/M2 | HEART RATE: 49 BPM | SYSTOLIC BLOOD PRESSURE: 98 MMHG

## 2024-03-21 DIAGNOSIS — H60.331 ACUTE SWIMMER'S EAR OF RIGHT SIDE: ICD-10-CM

## 2024-03-21 DIAGNOSIS — H92.01 ACUTE EAR PAIN, RIGHT: Primary | ICD-10-CM

## 2024-03-21 PROBLEM — H60.501 ACUTE OTITIS EXTERNA OF RIGHT EAR: Status: ACTIVE | Noted: 2024-03-21

## 2024-03-21 PROCEDURE — 99213 OFFICE O/P EST LOW 20 MIN: CPT | Performed by: INTERNAL MEDICINE

## 2024-03-21 RX ORDER — CIPROFLOXACIN AND DEXAMETHASONE 3; 1 MG/ML; MG/ML
4 SUSPENSION/ DROPS AURICULAR (OTIC) 2 TIMES DAILY
Qty: 7.5 ML | Refills: 0 | Status: SHIPPED | OUTPATIENT
Start: 2024-03-21 | End: 2024-04-09

## 2024-03-21 RX ORDER — SUMATRIPTAN 50 MG/1
50 TABLET, FILM COATED ORAL
COMMUNITY

## 2024-03-21 ASSESSMENT — ENCOUNTER SYMPTOMS
EYE PAIN: 0
SHORTNESS OF BREATH: 0
SINUS PAIN: 0
EYE DISCHARGE: 0
EYE ITCHING: 0
RHINORRHEA: 0
WHEEZING: 0

## 2024-03-21 NOTE — PROGRESS NOTES
Beth Hill (:  1985) is a 38 y.o. female here for evaluation of the following chief complaint(s):  Otalgia (Right ear pain x 3 days)      ASSESSMENT/PLAN:  1. Acute ear pain, right  Assessment & Plan:     Due to otitis externa.  2. Acute swimmer's ear of right side  Assessment & Plan:     Suspect due to abrasion from q-tip, advised against q-tip use.  Treat with ciprodex 4 drops BID. Given rtc instructions for fever, worsening symptoms or hearing changes.      No follow-ups on file.    SUBJECTIVE/OBJECTIVE:  This is a 38 year old brandy with history of anxiety, asthma, history of Hodgkin's disease.  She presents for an acute visit.  The patient has had 2 days of right ear pain. Denies any recent upper respiratory infection. Not sure if she has scratched the ear canal.   She states it feels like swimmer's ear but has not been swimming. She has been using q-tips.   No ear discharge, no known blood no fevers or chills. No change in hearing. No current dizziness.         Past Medical History:   Diagnosis Date    Asthma     sports induced    Cancer (HCC)     Genital HSV     last outbreak 2008    Hodgkin lymphoma (HCC)     Hot flashes     Hx of biopsy 2007          Review of Systems   Constitutional:  Negative for chills and fever.   HENT:  Positive for ear pain. Negative for ear discharge, rhinorrhea and sinus pain.    Eyes:  Negative for pain, discharge, itching and visual disturbance.   Respiratory:  Negative for shortness of breath and wheezing.    Cardiovascular:  Negative for chest pain and palpitations.       BP 98/78   Pulse (!) 49   Wt 73.5 kg (162 lb)   SpO2 98%   BMI 25.37 kg/m²    Physical Exam  Constitutional:       General: She is not in acute distress.     Appearance: She is normal weight. She is not toxic-appearing.   HENT:      Head: Normocephalic and atraumatic.      Ears:      Comments: L TM clear canal is clear  R TM is clear, patient has abrasion to canal and there is tenderness

## 2024-05-05 DIAGNOSIS — G43.009 MIGRAINE WITHOUT AURA AND WITHOUT STATUS MIGRAINOSUS, NOT INTRACTABLE: ICD-10-CM

## 2024-05-06 RX ORDER — AMITRIPTYLINE HYDROCHLORIDE 10 MG/1
10 TABLET, FILM COATED ORAL NIGHTLY
Qty: 90 TABLET | Refills: 0 | Status: SHIPPED | OUTPATIENT
Start: 2024-05-06

## 2024-05-06 NOTE — TELEPHONE ENCOUNTER
Last OV: 3/21/2024  Next OV: 6/26/2024    Next appointment due:not stated     Last fill:2/6/24  Refills:0

## 2024-06-26 ENCOUNTER — OFFICE VISIT (OUTPATIENT)
Dept: INTERNAL MEDICINE CLINIC | Age: 39
End: 2024-06-26
Payer: COMMERCIAL

## 2024-06-26 VITALS
OXYGEN SATURATION: 98 % | HEIGHT: 67 IN | SYSTOLIC BLOOD PRESSURE: 100 MMHG | DIASTOLIC BLOOD PRESSURE: 62 MMHG | HEART RATE: 91 BPM | BODY MASS INDEX: 25.43 KG/M2 | WEIGHT: 162 LBS

## 2024-06-26 DIAGNOSIS — G43.009 MIGRAINE WITHOUT AURA AND WITHOUT STATUS MIGRAINOSUS, NOT INTRACTABLE: ICD-10-CM

## 2024-06-26 DIAGNOSIS — Z71.89 ACP (ADVANCE CARE PLANNING): ICD-10-CM

## 2024-06-26 DIAGNOSIS — Z00.00 ENCOUNTER FOR WELL ADULT EXAM WITHOUT ABNORMAL FINDINGS: Primary | ICD-10-CM

## 2024-06-26 DIAGNOSIS — F41.9 ANXIETY: ICD-10-CM

## 2024-06-26 DIAGNOSIS — L24.7 PLANT IRRITANT CONTACT DERMATITIS: ICD-10-CM

## 2024-06-26 PROBLEM — H60.501 ACUTE OTITIS EXTERNA OF RIGHT EAR: Status: RESOLVED | Noted: 2024-03-21 | Resolved: 2024-06-26

## 2024-06-26 PROBLEM — H92.01 ACUTE EAR PAIN, RIGHT: Status: RESOLVED | Noted: 2024-03-21 | Resolved: 2024-06-26

## 2024-06-26 LAB
CHOLESTEROL, TOTAL: 193 MG/DL
CHOLESTEROL/HDL RATIO: 1.6
HDLC SERPL-MCNC: 69 MG/DL (ref 35–70)
LDL CHOLESTEROL: 111
NONHDLC SERPL-MCNC: NORMAL MG/DL
TRIGL SERPL-MCNC: 69 MG/DL
VLDLC SERPL CALC-MCNC: 13 MG/DL

## 2024-06-26 PROCEDURE — 99395 PREV VISIT EST AGE 18-39: CPT | Performed by: NURSE PRACTITIONER

## 2024-06-26 RX ORDER — SUMATRIPTAN 100 MG/1
100 TABLET, FILM COATED ORAL DAILY PRN
Qty: 20 TABLET | Refills: 1 | Status: SHIPPED | OUTPATIENT
Start: 2024-06-26

## 2024-06-26 RX ORDER — PREDNISONE 10 MG/1
TABLET ORAL
Qty: 42 TABLET | Refills: 0 | Status: SHIPPED | OUTPATIENT
Start: 2024-06-26

## 2024-06-26 ASSESSMENT — PATIENT HEALTH QUESTIONNAIRE - PHQ9
SUM OF ALL RESPONSES TO PHQ QUESTIONS 1-9: 0
6. FEELING BAD ABOUT YOURSELF - OR THAT YOU ARE A FAILURE OR HAVE LET YOURSELF OR YOUR FAMILY DOWN: NOT AT ALL
7. TROUBLE CONCENTRATING ON THINGS, SUCH AS READING THE NEWSPAPER OR WATCHING TELEVISION: NOT AT ALL
9. THOUGHTS THAT YOU WOULD BE BETTER OFF DEAD, OR OF HURTING YOURSELF: NOT AT ALL
4. FEELING TIRED OR HAVING LITTLE ENERGY: NOT AT ALL
2. FEELING DOWN, DEPRESSED OR HOPELESS: NOT AT ALL
SUM OF ALL RESPONSES TO PHQ QUESTIONS 1-9: 0
8. MOVING OR SPEAKING SO SLOWLY THAT OTHER PEOPLE COULD HAVE NOTICED. OR THE OPPOSITE, BEING SO FIGETY OR RESTLESS THAT YOU HAVE BEEN MOVING AROUND A LOT MORE THAN USUAL: NOT AT ALL
SUM OF ALL RESPONSES TO PHQ QUESTIONS 1-9: 0
SUM OF ALL RESPONSES TO PHQ QUESTIONS 1-9: 0
10. IF YOU CHECKED OFF ANY PROBLEMS, HOW DIFFICULT HAVE THESE PROBLEMS MADE IT FOR YOU TO DO YOUR WORK, TAKE CARE OF THINGS AT HOME, OR GET ALONG WITH OTHER PEOPLE: NOT DIFFICULT AT ALL
5. POOR APPETITE OR OVEREATING: NOT AT ALL
SUM OF ALL RESPONSES TO PHQ9 QUESTIONS 1 & 2: 0
1. LITTLE INTEREST OR PLEASURE IN DOING THINGS: NOT AT ALL
3. TROUBLE FALLING OR STAYING ASLEEP: NOT AT ALL

## 2024-06-26 ASSESSMENT — ENCOUNTER SYMPTOMS
CHEST TIGHTNESS: 0
SHORTNESS OF BREATH: 0
COUGH: 0
WHEEZING: 0

## 2024-06-26 NOTE — ASSESSMENT & PLAN NOTE
Acute, uncontrolled. Reviewed supportive care with cool compresses, antihistamine prn for itching. Avoid plant contact with long sleeves and pants. Covering with steroids due to diffuse nature of rash.

## 2024-06-26 NOTE — ASSESSMENT & PLAN NOTE
Annual exam. HM reviewed. Labs ordered and will complete at [facility] [address].

## 2024-06-26 NOTE — PROGRESS NOTES
answered.    Return in about 1 year (around 6/26/2025), or if symptoms worsen or fail to improve, for annual exam .      Electronically signed by ADAM Horne CNP on 6/26/2024 at 9:09 AM

## 2024-06-26 NOTE — PATIENT INSTRUCTIONS

## 2024-06-26 NOTE — ASSESSMENT & PLAN NOTE
Chronic, intermittent.   Work on decreasing external stressors   Continue to work on supportive care and healthy lifestyle modifications.   use sumatriptan dose to 100 mg tab daily as needed  Okay to use prn excedrin   Avoid daily abortive medication use to prevent rebound HA  Work on taking amitriptyline 10 mg nightly and avoid missing doses

## 2024-07-26 PROBLEM — Z00.00 ENCOUNTER FOR WELL ADULT EXAM WITHOUT ABNORMAL FINDINGS: Status: RESOLVED | Noted: 2024-06-26 | Resolved: 2024-07-26

## 2024-08-01 DIAGNOSIS — G43.009 MIGRAINE WITHOUT AURA AND WITHOUT STATUS MIGRAINOSUS, NOT INTRACTABLE: ICD-10-CM

## 2024-08-01 RX ORDER — SUMATRIPTAN 50 MG/1
TABLET, FILM COATED ORAL
Qty: 9 TABLET | Refills: 3 | OUTPATIENT
Start: 2024-08-01

## 2024-09-14 DIAGNOSIS — G43.009 MIGRAINE WITHOUT AURA AND WITHOUT STATUS MIGRAINOSUS, NOT INTRACTABLE: ICD-10-CM

## 2024-09-16 RX ORDER — AMITRIPTYLINE HYDROCHLORIDE 10 MG/1
10 TABLET ORAL NIGHTLY
Qty: 90 TABLET | Refills: 0 | Status: SHIPPED | OUTPATIENT
Start: 2024-09-16

## 2024-09-16 RX ORDER — SUMATRIPTAN 100 MG/1
100 TABLET, FILM COATED ORAL DAILY PRN
Qty: 20 TABLET | Refills: 1 | Status: SHIPPED | OUTPATIENT
Start: 2024-09-16

## 2025-01-07 DIAGNOSIS — G43.009 MIGRAINE WITHOUT AURA AND WITHOUT STATUS MIGRAINOSUS, NOT INTRACTABLE: ICD-10-CM

## 2025-01-07 RX ORDER — AMITRIPTYLINE HYDROCHLORIDE 10 MG/1
10 TABLET ORAL NIGHTLY
Qty: 90 TABLET | Refills: 0 | Status: SHIPPED | OUTPATIENT
Start: 2025-01-07

## 2025-01-08 ENCOUNTER — OFFICE VISIT (OUTPATIENT)
Dept: INTERNAL MEDICINE CLINIC | Age: 40
End: 2025-01-08

## 2025-01-08 VITALS
BODY MASS INDEX: 25.96 KG/M2 | OXYGEN SATURATION: 99 % | TEMPERATURE: 98.1 F | WEIGHT: 165.4 LBS | SYSTOLIC BLOOD PRESSURE: 116 MMHG | DIASTOLIC BLOOD PRESSURE: 78 MMHG | HEIGHT: 67 IN | HEART RATE: 95 BPM

## 2025-01-08 DIAGNOSIS — H66.002 NON-RECURRENT ACUTE SUPPURATIVE OTITIS MEDIA OF LEFT EAR WITHOUT SPONTANEOUS RUPTURE OF TYMPANIC MEMBRANE: Primary | ICD-10-CM

## 2025-01-08 DIAGNOSIS — J06.9 URI, ACUTE: ICD-10-CM

## 2025-01-08 RX ORDER — KETOROLAC TROMETHAMINE 30 MG/ML
60 INJECTION, SOLUTION INTRAMUSCULAR; INTRAVENOUS ONCE
Status: COMPLETED | OUTPATIENT
Start: 2025-01-08 | End: 2025-01-08

## 2025-01-08 RX ADMIN — KETOROLAC TROMETHAMINE 60 MG: 30 INJECTION, SOLUTION INTRAMUSCULAR; INTRAVENOUS at 12:05

## 2025-01-08 SDOH — ECONOMIC STABILITY: FOOD INSECURITY: WITHIN THE PAST 12 MONTHS, YOU WORRIED THAT YOUR FOOD WOULD RUN OUT BEFORE YOU GOT MONEY TO BUY MORE.: NEVER TRUE

## 2025-01-08 SDOH — ECONOMIC STABILITY: FOOD INSECURITY: WITHIN THE PAST 12 MONTHS, THE FOOD YOU BOUGHT JUST DIDN'T LAST AND YOU DIDN'T HAVE MONEY TO GET MORE.: NEVER TRUE

## 2025-01-08 ASSESSMENT — ENCOUNTER SYMPTOMS
TROUBLE SWALLOWING: 0
FACIAL SWELLING: 1
RHINORRHEA: 1
WHEEZING: 0
SHORTNESS OF BREATH: 0
SORE THROAT: 0
COUGH: 0

## 2025-01-08 ASSESSMENT — PATIENT HEALTH QUESTIONNAIRE - PHQ9
1. LITTLE INTEREST OR PLEASURE IN DOING THINGS: NOT AT ALL
2. FEELING DOWN, DEPRESSED OR HOPELESS: NOT AT ALL
SUM OF ALL RESPONSES TO PHQ QUESTIONS 1-9: 0
SUM OF ALL RESPONSES TO PHQ9 QUESTIONS 1 & 2: 0
SUM OF ALL RESPONSES TO PHQ QUESTIONS 1-9: 0

## 2025-01-08 NOTE — PROGRESS NOTES
Office Visit   1/8/2025    Assessment and Plan:  1. Non-recurrent acute suppurative otitis media of left ear without spontaneous rupture of tympanic membrane  Assessment & Plan:  -  Acute, new problem  -  Severe left ear pain with left facial swelling today.  -  Augmentin 875 mg bid for 7 days.  - Toradol 60 mg IM given for severe ear pain.  - Tylenol for pain  -  Follow-up in 1 week  Orders:  -     amoxicillin-clavulanate (AUGMENTIN) 875-125 MG per tablet; Take 1 tablet by mouth 2 times daily for 7 days, Disp-14 tablet, R-0Normal  -     ketorolac (TORADOL) injection 60 mg; 60 mg, IntraMUSCular, ONCE, 1 dose, On Wed 1/8/25 at 1215Do not administer for more than 5 days.  2. URI, acute  Assessment & Plan:  Acute, new problem  Symptomatic care reviewed  Recommend decongestant and flonase         Return in about 1 week (around 1/15/2025) for ear infection.     Subjective:  Chief Complaint   Patient presents with    Ear Pain     Constant left ear pain x 1 day        HPI:   Beth Hill is a 39 y.o. female who presents to the clinic today for left ear pain.    Started today with severe constant left ear pain.  Denies discharge, tinnitus, or hearing loss.  Left ear feels muffled \"like it needs to pop\".   Pain radiates to jaw and cheek. Left cheek swollen.  Denies dental issues.    Reports nasal congestion and URI symptoms in past week..    Review of Systems   Constitutional:  Negative for chills and fever.   HENT:  Positive for congestion, ear pain, facial swelling and rhinorrhea. Negative for ear discharge, postnasal drip, sore throat, tinnitus and trouble swallowing.    Respiratory:  Negative for cough, shortness of breath and wheezing.    Cardiovascular:  Negative for chest pain.   Neurological:  Negative for dizziness, light-headedness and headaches.       No Known Allergies  Current Outpatient Rx   Medication Sig Dispense Refill    amoxicillin-clavulanate (AUGMENTIN) 875-125 MG per tablet Take 1 tablet by mouth 2

## 2025-01-09 NOTE — ASSESSMENT & PLAN NOTE
-  Acute, new problem  -  Severe left ear pain with left facial swelling today.  -  Augmentin 875 mg bid for 7 days.  - Toradol 60 mg IM given for severe ear pain.  - Tylenol for pain  -  Follow-up in 1 week

## 2025-04-10 DIAGNOSIS — G43.009 MIGRAINE WITHOUT AURA AND WITHOUT STATUS MIGRAINOSUS, NOT INTRACTABLE: ICD-10-CM

## 2025-04-11 RX ORDER — AMITRIPTYLINE HYDROCHLORIDE 10 MG/1
10 TABLET ORAL NIGHTLY
Qty: 90 TABLET | Refills: 0 | Status: SHIPPED | OUTPATIENT
Start: 2025-04-11

## 2025-05-19 DIAGNOSIS — G43.009 MIGRAINE WITHOUT AURA AND WITHOUT STATUS MIGRAINOSUS, NOT INTRACTABLE: ICD-10-CM

## 2025-05-19 RX ORDER — SUMATRIPTAN SUCCINATE 100 MG/1
100 TABLET ORAL DAILY PRN
Qty: 20 TABLET | Refills: 1 | Status: SHIPPED | OUTPATIENT
Start: 2025-05-19

## 2025-05-19 NOTE — TELEPHONE ENCOUNTER
Last OV: 1/8/2025  Next OV: 6/26/2025    Next appointment due: 6/26/2025    Last fill: 9/16/2024  Refills: 1

## 2025-06-26 ENCOUNTER — OFFICE VISIT (OUTPATIENT)
Dept: INTERNAL MEDICINE CLINIC | Age: 40
End: 2025-06-26
Payer: COMMERCIAL

## 2025-06-26 VITALS
HEART RATE: 91 BPM | BODY MASS INDEX: 24.89 KG/M2 | OXYGEN SATURATION: 96 % | SYSTOLIC BLOOD PRESSURE: 96 MMHG | HEIGHT: 67 IN | WEIGHT: 158.6 LBS | DIASTOLIC BLOOD PRESSURE: 68 MMHG

## 2025-06-26 DIAGNOSIS — Z12.31 ENCOUNTER FOR SCREENING MAMMOGRAM FOR MALIGNANT NEOPLASM OF BREAST: ICD-10-CM

## 2025-06-26 DIAGNOSIS — Z85.71 HISTORY OF HODGKIN'S DISEASE: ICD-10-CM

## 2025-06-26 DIAGNOSIS — Z00.00 ENCOUNTER FOR WELL ADULT EXAM WITHOUT ABNORMAL FINDINGS: Primary | ICD-10-CM

## 2025-06-26 DIAGNOSIS — G43.009 MIGRAINE WITHOUT AURA AND WITHOUT STATUS MIGRAINOSUS, NOT INTRACTABLE: ICD-10-CM

## 2025-06-26 PROBLEM — J06.9 URI, ACUTE: Status: RESOLVED | Noted: 2025-01-08 | Resolved: 2025-06-26

## 2025-06-26 PROBLEM — L24.7 PLANT IRRITANT CONTACT DERMATITIS: Status: RESOLVED | Noted: 2024-06-26 | Resolved: 2025-06-26

## 2025-06-26 PROBLEM — H66.002 NON-RECURRENT ACUTE SUPPURATIVE OTITIS MEDIA OF LEFT EAR WITHOUT SPONTANEOUS RUPTURE OF TYMPANIC MEMBRANE: Status: RESOLVED | Noted: 2025-01-08 | Resolved: 2025-06-26

## 2025-06-26 LAB
ALBUMIN: 4 G/DL
ALP BLD-CCNC: 45 U/L
ALT SERPL-CCNC: 8 U/L
ANION GAP SERPL CALCULATED.3IONS-SCNC: 12.1 MMOL/L
AST SERPL-CCNC: 17 U/L
BASOPHILS ABSOLUTE: ABNORMAL
BASOPHILS RELATIVE PERCENT: ABNORMAL
BILIRUB SERPL-MCNC: 0.6 MG/DL (ref 0.1–1.4)
BUN BLDV-MCNC: 16 MG/DL
CALCIUM SERPL-MCNC: 9.4 MG/DL
CHLORIDE BLD-SCNC: 103 MMOL/L
CO2: 25.9 MMOL/L
CREAT SERPL-MCNC: 0.81 MG/DL
EOSINOPHILS ABSOLUTE: ABNORMAL
EOSINOPHILS RELATIVE PERCENT: ABNORMAL
GFR, ESTIMATED: >60
GLUCOSE BLD-MCNC: 96 MG/DL
HCT VFR BLD CALC: ABNORMAL %
HEMOGLOBIN: ABNORMAL
LYMPHOCYTES ABSOLUTE: ABNORMAL
LYMPHOCYTES RELATIVE PERCENT: ABNORMAL
MCH RBC QN AUTO: ABNORMAL PG
MCHC RBC AUTO-ENTMCNC: ABNORMAL G/DL
MCV RBC AUTO: ABNORMAL FL
MONOCYTES ABSOLUTE: ABNORMAL
MONOCYTES RELATIVE PERCENT: ABNORMAL
NEUTROPHILS ABSOLUTE: ABNORMAL
NEUTROPHILS RELATIVE PERCENT: ABNORMAL
PLATELET # BLD: ABNORMAL 10*3/UL
PMV BLD AUTO: ABNORMAL FL
POTASSIUM SERPL-SCNC: 4.2 MMOL/L
RBC # BLD: ABNORMAL 10*6/UL
SODIUM BLD-SCNC: 141 MMOL/L
TOTAL PROTEIN: 6.9 G/DL (ref 6.4–8.2)
WBC # BLD: ABNORMAL 10*3/UL

## 2025-06-26 PROCEDURE — 99395 PREV VISIT EST AGE 18-39: CPT | Performed by: NURSE PRACTITIONER

## 2025-06-26 RX ORDER — AMITRIPTYLINE HYDROCHLORIDE 10 MG/1
10 TABLET ORAL NIGHTLY
Qty: 90 TABLET | Refills: 3 | Status: SHIPPED | OUTPATIENT
Start: 2025-06-26

## 2025-06-26 ASSESSMENT — ENCOUNTER SYMPTOMS
WHEEZING: 0
COUGH: 0
SHORTNESS OF BREATH: 0
CHEST TIGHTNESS: 0

## 2025-06-26 NOTE — ASSESSMENT & PLAN NOTE
Chronic, intermittent.   Continue to work on supportive care and healthy lifestyle modifications.   use sumatriptan dose to 100 mg tab daily as needed  Continue amitriptyline 10 mg nightly     Orders:    amitriptyline (ELAVIL) 10 MG tablet; Take 1 tablet by mouth nightly

## 2025-06-26 NOTE — PROGRESS NOTES
6/26/25     Chief Complaint   Patient presents with    Annual Exam     HPI    Here for annual exam and follow up  Migraines are better controlled and doing well on amitriptyline   Denies any s/e of medication   Using imitrex about 2-3 times per month for abortive treatment   Seeing GYN and hemoc later today   Has BW ordered with hemoc     No Known Allergies    Current Outpatient Medications   Medication Sig Dispense Refill    amitriptyline (ELAVIL) 10 MG tablet Take 1 tablet by mouth nightly 90 tablet 3    SUMAtriptan (IMITREX) 100 MG tablet Take 1 tablet by mouth daily as needed for Migraine 20 tablet 1     No current facility-administered medications for this visit.     Review of Systems   Constitutional:  Negative for chills, fatigue and fever.   Respiratory:  Negative for cough, chest tightness, shortness of breath and wheezing.    Cardiovascular:  Negative for chest pain, palpitations and leg swelling.   Neurological:  Negative for dizziness, tremors, light-headedness and headaches.         Vitals:    06/26/25 0838   BP: 96/68   BP Site: Left Upper Arm   Patient Position: Sitting   BP Cuff Size: Medium Adult   Pulse: 91   SpO2: 96%   Weight: 71.9 kg (158 lb 9.6 oz)   Height: 1.702 m (5' 7\")      Physical Exam  Constitutional:       General: She is not in acute distress.     Appearance: Normal appearance. She is not ill-appearing.   HENT:      Head: Normocephalic and atraumatic.   Cardiovascular:      Rate and Rhythm: Normal rate and regular rhythm.      Heart sounds: Normal heart sounds.   Pulmonary:      Effort: Pulmonary effort is normal. No respiratory distress.      Breath sounds: Normal breath sounds.   Neurological:      Mental Status: She is alert and oriented to person, place, and time. Mental status is at baseline.   Psychiatric:         Mood and Affect: Mood normal.         Behavior: Behavior normal.         Assessment/Plan:  Assessment & Plan  Encounter for well adult exam without abnormal

## 2025-07-07 ENCOUNTER — TRANSCRIBE ORDERS (OUTPATIENT)
Dept: ADMINISTRATIVE | Age: 40
End: 2025-07-07

## 2025-07-07 DIAGNOSIS — N63.20 MASS OF LEFT BREAST, UNSPECIFIED QUADRANT: Primary | ICD-10-CM

## 2025-07-08 ENCOUNTER — TRANSCRIBE ORDERS (OUTPATIENT)
Dept: ADMINISTRATIVE | Age: 40
End: 2025-07-08

## 2025-07-08 DIAGNOSIS — N63.20 MASS OF LEFT BREAST, UNSPECIFIED QUADRANT: Primary | ICD-10-CM

## 2025-07-14 ENCOUNTER — HOSPITAL ENCOUNTER (OUTPATIENT)
Dept: MAMMOGRAPHY | Age: 40
Discharge: HOME | End: 2025-07-14
Attending: OBSTETRICS & GYNECOLOGY
Payer: COMMERCIAL

## 2025-07-14 ENCOUNTER — HOSPITAL ENCOUNTER (OUTPATIENT)
Dept: ULTRASOUND IMAGING | Age: 40
Discharge: HOME | End: 2025-07-14
Attending: OBSTETRICS & GYNECOLOGY
Payer: COMMERCIAL

## 2025-07-14 VITALS — BODY MASS INDEX: 25.11 KG/M2 | HEIGHT: 67 IN | WEIGHT: 160 LBS

## 2025-07-14 DIAGNOSIS — N63.20 MASS OF LEFT BREAST, UNSPECIFIED QUADRANT: ICD-10-CM

## 2025-07-14 DIAGNOSIS — R92.8 ABNORMAL MAMMOGRAM: ICD-10-CM

## 2025-07-14 PROCEDURE — 76642 ULTRASOUND BREAST LIMITED: CPT

## 2025-07-14 PROCEDURE — G0279 TOMOSYNTHESIS, MAMMO: HCPCS
